# Patient Record
Sex: MALE | Race: WHITE | URBAN - METROPOLITAN AREA
[De-identification: names, ages, dates, MRNs, and addresses within clinical notes are randomized per-mention and may not be internally consistent; named-entity substitution may affect disease eponyms.]

---

## 2022-04-14 ENCOUNTER — OFFICE VISIT (OUTPATIENT)
Dept: URBAN - METROPOLITAN AREA CLINIC 27 | Facility: CLINIC | Age: 82
End: 2022-04-14
Payer: MEDICARE

## 2022-04-14 DIAGNOSIS — H35.373 PUCKERING OF MACULA, BILATERAL: ICD-10-CM

## 2022-04-14 DIAGNOSIS — H35.342 MACULAR CYST, HOLE, OR PSEUDOHOLE, LEFT EYE: Primary | ICD-10-CM

## 2022-04-14 DIAGNOSIS — H43.813 VITREOUS DEGENERATION, BILATERAL: ICD-10-CM

## 2022-04-14 DIAGNOSIS — Z96.1 PRESENCE OF INTRAOCULAR LENS: ICD-10-CM

## 2022-04-14 PROCEDURE — 99204 OFFICE O/P NEW MOD 45 MIN: CPT | Performed by: OPHTHALMOLOGY

## 2022-04-14 PROCEDURE — 92134 CPTRZ OPH DX IMG PST SGM RTA: CPT | Performed by: OPHTHALMOLOGY

## 2022-04-14 NOTE — IMPRESSION/PLAN
Impression: Macular cyst, hole, or pseudohole, left eye: H35.342. Plan: --exam/OCT reveal ERM with lamellar MH formation OS
--findings/diagnosis d/w pt in detail --rec observation due to minimal symptoms --monitor for progression with periodic OCT testing
--[employer] grid self-monitoring discussed --pt instructed to call with s/s dec [facility]

RTC 6 months DFE/OCT OU

## 2022-04-14 NOTE — IMPRESSION/PLAN
Impression: Puckering of macula, bilateral: H35.373. Plan: --exam/OCT reveal a mild ERM OU
--findings/diagnosis d/w pt in detail
--options discussed, including observation vs surgery
--rec obs given current VA and minimal hindrance of ADLs
--[employer] grid self-monitoring discussed --pt instructed to call with s/s dec [facility]

## 2022-10-20 ENCOUNTER — OFFICE VISIT (OUTPATIENT)
Dept: URBAN - METROPOLITAN AREA CLINIC 27 | Facility: CLINIC | Age: 82
End: 2022-10-20
Payer: MEDICARE

## 2022-10-20 DIAGNOSIS — Z96.1 PRESENCE OF INTRAOCULAR LENS: ICD-10-CM

## 2022-10-20 DIAGNOSIS — H35.342 MACULAR CYST, HOLE, OR PSEUDOHOLE, LEFT EYE: Primary | ICD-10-CM

## 2022-10-20 DIAGNOSIS — H43.813 VITREOUS DEGENERATION, BILATERAL: ICD-10-CM

## 2022-10-20 DIAGNOSIS — H35.373 PUCKERING OF MACULA, BILATERAL: ICD-10-CM

## 2022-10-20 PROCEDURE — 92014 COMPRE OPH EXAM EST PT 1/>: CPT | Performed by: OPHTHALMOLOGY

## 2022-10-20 PROCEDURE — 92134 CPTRZ OPH DX IMG PST SGM RTA: CPT | Performed by: OPHTHALMOLOGY

## 2022-10-20 ASSESSMENT — INTRAOCULAR PRESSURE
OD: 10
OS: 11

## 2022-10-20 NOTE — IMPRESSION/PLAN
Impression: Macular cyst, hole, or pseudohole, left eye: H35.342. Plan: --exam/OCT demonstrate a stable lamellar MH OS
--findings/diagnosis d/w pt in detail --rec continue observation due to minimal symptoms --monitor for progression with periodic OCT testing
--[employer] grid self-monitoring discussed --pt instructed to call with s/s dec [facility]

RTC 12 months DFE/OCT OU

## 2022-10-20 NOTE — IMPRESSION/PLAN
Impression: Puckering of macula, bilateral: H35.373. Plan: --exam/OCT demonstrate a stable ERM OU
--options discussed, including observation vs surgery
--rec obs given current VA and minimal hindrance of ADLs
--[employer] grid self-monitoring discussed --pt instructed to call with s/s dec [facility]

## 2023-10-19 ENCOUNTER — OFFICE VISIT (OUTPATIENT)
Dept: URBAN - METROPOLITAN AREA CLINIC 27 | Facility: CLINIC | Age: 83
End: 2023-10-19
Payer: MEDICARE

## 2023-10-19 DIAGNOSIS — H43.813 VITREOUS DEGENERATION, BILATERAL: ICD-10-CM

## 2023-10-19 DIAGNOSIS — H35.373 PUCKERING OF MACULA, BILATERAL: ICD-10-CM

## 2023-10-19 DIAGNOSIS — H35.342 MACULAR CYST, HOLE, OR PSEUDOHOLE, LEFT EYE: Primary | ICD-10-CM

## 2023-10-19 DIAGNOSIS — Z96.1 PRESENCE OF INTRAOCULAR LENS: ICD-10-CM

## 2023-10-19 PROCEDURE — 92134 CPTRZ OPH DX IMG PST SGM RTA: CPT | Performed by: OPHTHALMOLOGY

## 2023-10-19 PROCEDURE — 92014 COMPRE OPH EXAM EST PT 1/>: CPT | Performed by: OPHTHALMOLOGY

## 2023-10-19 ASSESSMENT — INTRAOCULAR PRESSURE
OD: 15
OS: 13